# Patient Record
Sex: FEMALE | Race: WHITE
[De-identification: names, ages, dates, MRNs, and addresses within clinical notes are randomized per-mention and may not be internally consistent; named-entity substitution may affect disease eponyms.]

---

## 2020-06-04 ENCOUNTER — ERX REFILL RESPONSE (OUTPATIENT)
Age: 55
End: 2020-06-04

## 2020-06-04 RX ORDER — COLESEVELAM HYDROCHLORIDE 625 MG/1
TAKE ONE TABLET BY MOUTH TWICE A DAY TABLET, FILM COATED ORAL
Qty: 60 | Refills: 0

## 2020-06-25 ENCOUNTER — OFFICE VISIT (OUTPATIENT)
Dept: URBAN - METROPOLITAN AREA TELEHEALTH 2 | Facility: TELEHEALTH | Age: 55
End: 2020-06-25

## 2020-06-25 RX ORDER — METOCLOPRAMIDE HYDROCHLORIDE 10 MG/1
TAKE 1 TABLET BY ORAL ROUTE 3 TIMES A DAY FOR 15 DAYS TABLET ORAL
Qty: 45 | Refills: 0 | Status: ACTIVE | COMMUNITY
Start: 2018-01-04

## 2020-06-25 RX ORDER — ASCORBIC ACID 500 MG
TABLET ORAL
Qty: 0 | Refills: 0 | Status: ACTIVE | COMMUNITY
Start: 1900-01-01

## 2020-06-25 RX ORDER — COLESEVELAM HYDROCHLORIDE 625 MG/1
TAKE ONE TABLET BY MOUTH TWICE A DAY TABLET, FILM COATED ORAL
Qty: 60 | Refills: 0 | Status: ACTIVE | COMMUNITY

## 2020-06-25 RX ORDER — FUROSEMIDE 40 MG/1
TAKE 1 TABLET (40 MG) BY ORAL ROUTE ONCE DAILY TABLET ORAL 1
Qty: 0 | Refills: 0 | Status: ACTIVE | COMMUNITY
Start: 1900-01-01

## 2020-06-25 RX ORDER — CHROMIUM 200 MCG
TAKE 1 TABLET (0.8 MG) BY ORAL ROUTE ONCE DAILY TABLET ORAL 1
Qty: 0 | Refills: 0 | Status: ACTIVE | COMMUNITY
Start: 1900-01-01

## 2020-06-25 RX ORDER — POTASSIUM CHLORIDE 1.5 G/1
DISSOLVE AS DIRECTED 1 PACKET AND TAKE BY ORAL ROUTE DAILY POWDER, FOR SOLUTION ORAL 1
Qty: 0 | Refills: 0 | Status: ACTIVE | COMMUNITY
Start: 1900-01-01

## 2020-06-25 RX ORDER — MULTIVITAMIN WITH IRON
TAKE 1 TABLET BY ORAL ROUTE DAILY TABLET ORAL 1
Qty: 0 | Refills: 0 | Status: ACTIVE | COMMUNITY
Start: 1900-01-01

## 2020-06-25 RX ORDER — LEVOTHYROXINE SODIUM 125 UG/1
TAKE 1 TABLET (125 MCG) BY ORAL ROUTE ONCE DAILY TABLET ORAL 1
Qty: 0 | Refills: 0 | Status: ACTIVE | COMMUNITY
Start: 1900-01-01

## 2020-06-25 RX ORDER — SUCRALFATE 1 G/1
TAKE 1 TABLET BY ORAL ROUTE 3 TIMES A DAY TABLET ORAL
Qty: 0 | Refills: 0 | Status: ACTIVE | COMMUNITY
Start: 1900-01-01

## 2020-06-30 ENCOUNTER — TELEPHONE ENCOUNTER (OUTPATIENT)
Dept: URBAN - METROPOLITAN AREA CLINIC 115 | Facility: CLINIC | Age: 55
End: 2020-06-30

## 2020-08-06 ENCOUNTER — ERX REFILL RESPONSE (OUTPATIENT)
Age: 55
End: 2020-08-06

## 2020-08-06 RX ORDER — COLESEVELAM HYDROCHLORIDE 625 MG/1
TAKE ONE TABLET BY MOUTH TWICE A DAY TABLET, FILM COATED ORAL
Qty: 60 | Refills: 0

## 2020-08-28 ENCOUNTER — OFFICE VISIT (OUTPATIENT)
Dept: URBAN - METROPOLITAN AREA CLINIC 82 | Facility: CLINIC | Age: 55
End: 2020-08-28

## 2020-08-31 ENCOUNTER — TELEPHONE ENCOUNTER (OUTPATIENT)
Dept: URBAN - METROPOLITAN AREA CLINIC 92 | Facility: CLINIC | Age: 55
End: 2020-08-31

## 2020-08-31 RX ORDER — FAMOTIDINE 40 MG/1
1 TABLET AT BEDTIME TABLET, FILM COATED ORAL TWICE A DAY
Qty: 180 TABLET | Refills: 1 | OUTPATIENT
Start: 2020-08-31

## 2020-09-08 ENCOUNTER — ERX REFILL RESPONSE (OUTPATIENT)
Age: 55
End: 2020-09-08

## 2020-09-08 RX ORDER — COLESEVELAM HYDROCHLORIDE 625 MG/1
TAKE ONE TABLET BY MOUTH TWICE A DAY TABLET, FILM COATED ORAL
Qty: 60 | Refills: 0

## 2020-09-23 ENCOUNTER — OFFICE VISIT (OUTPATIENT)
Dept: URBAN - METROPOLITAN AREA TELEHEALTH 2 | Facility: TELEHEALTH | Age: 55
End: 2020-09-23
Payer: COMMERCIAL

## 2020-09-23 DIAGNOSIS — K21.9 GERD (GASTROESOPHAGEAL REFLUX DISEASE): ICD-10-CM

## 2020-09-23 DIAGNOSIS — K58.0 IRRITABLE BOWEL SYNDROME WITH DIARRHEA: ICD-10-CM

## 2020-09-23 PROCEDURE — G8427 DOCREV CUR MEDS BY ELIG CLIN: HCPCS | Performed by: INTERNAL MEDICINE

## 2020-09-23 PROCEDURE — G8417 CALC BMI ABV UP PARAM F/U: HCPCS | Performed by: INTERNAL MEDICINE

## 2020-09-23 PROCEDURE — G9903 PT SCRN TBCO ID AS NON USER: HCPCS | Performed by: INTERNAL MEDICINE

## 2020-09-23 PROCEDURE — 99214 OFFICE O/P EST MOD 30 MIN: CPT | Performed by: INTERNAL MEDICINE

## 2020-09-23 PROCEDURE — 3017F COLORECTAL CA SCREEN DOC REV: CPT | Performed by: INTERNAL MEDICINE

## 2020-09-23 PROCEDURE — 1036F TOBACCO NON-USER: CPT | Performed by: INTERNAL MEDICINE

## 2020-09-23 RX ORDER — LEVOTHYROXINE SODIUM 125 UG/1
TAKE 1 TABLET (125 MCG) BY ORAL ROUTE ONCE DAILY TABLET ORAL 1
Qty: 0 | Refills: 0 | Status: ACTIVE | COMMUNITY
Start: 1900-01-01

## 2020-09-23 RX ORDER — MULTIVITAMIN WITH IRON
TAKE 1 TABLET BY ORAL ROUTE DAILY TABLET ORAL 1
Qty: 0 | Refills: 0 | Status: ACTIVE | COMMUNITY
Start: 1900-01-01

## 2020-09-23 RX ORDER — POTASSIUM CHLORIDE 1.5 G/1
DISSOLVE AS DIRECTED 1 PACKET AND TAKE BY ORAL ROUTE DAILY POWDER, FOR SOLUTION ORAL 1
Qty: 0 | Refills: 0 | Status: ACTIVE | COMMUNITY
Start: 1900-01-01

## 2020-09-23 RX ORDER — FUROSEMIDE 40 MG/1
TAKE 1 TABLET (40 MG) BY ORAL ROUTE ONCE DAILY TABLET ORAL 1
Qty: 0 | Refills: 0 | Status: ACTIVE | COMMUNITY
Start: 1900-01-01

## 2020-09-23 RX ORDER — COLESEVELAM HYDROCHLORIDE 625 MG/1
TAKE ONE TABLET BY MOUTH TWICE A DAY TABLET, FILM COATED ORAL
Qty: 60 | Refills: 0 | Status: ACTIVE | COMMUNITY

## 2020-09-23 RX ORDER — ASCORBIC ACID 500 MG
TABLET ORAL
Qty: 0 | Refills: 0 | Status: ACTIVE | COMMUNITY
Start: 1900-01-01

## 2020-09-23 RX ORDER — CHROMIUM 200 MCG
TAKE 1 TABLET (0.8 MG) BY ORAL ROUTE ONCE DAILY TABLET ORAL 1
Qty: 0 | Refills: 0 | Status: ACTIVE | COMMUNITY
Start: 1900-01-01

## 2020-09-23 RX ORDER — SUCRALFATE 1 G/1
TAKE 1 TABLET BY ORAL ROUTE 3 TIMES A DAY TABLET ORAL
Qty: 0 | Refills: 0 | Status: ACTIVE | COMMUNITY
Start: 1900-01-01

## 2020-09-23 RX ORDER — FAMOTIDINE 40 MG/1
1 TABLET AT BEDTIME TABLET, FILM COATED ORAL TWICE A DAY
Qty: 180 TABLET | Refills: 1 | Status: ACTIVE | COMMUNITY
Start: 2020-08-31

## 2020-09-23 RX ORDER — METOCLOPRAMIDE HYDROCHLORIDE 10 MG/1
TAKE 1 TABLET BY ORAL ROUTE 3 TIMES A DAY FOR 15 DAYS TABLET ORAL
Qty: 45 | Refills: 0 | Status: ACTIVE | COMMUNITY
Start: 2018-01-04

## 2020-09-23 NOTE — HPI-TODAY'S VISIT:
09/23/2020 Patient was seen via Telehealth. She states she feels better. She was having acid reflux symptoms which improved with refill of Famotidine. She continues the Famotidine po bid. She has been using the Welchol once a day, because po bid causes her constipation. Overall, symptoms much improved.

## 2020-09-23 NOTE — HPI-OTHER HISTORIES
This is a scheduled follow-up appointment for this patient, a 54 year old /White female, after a previous visit approximately 3 months ago, for an evaluation for anastomotic ulcer after taking Proton Pump Inhibitors. The patient has been on Protonix for 6 months with worsening of symptoms. She states since her last visit, the WHAT TYPE OF SYMPTOMS HAVE OCCURRED is better. She recently underwent a EGD and ulcer healed. She still complains of diarrhea shall lose bowel movement is not the eats up to seven times a day. He denies of nausea, and previous abdominal pain resolved. still c/o occasional heartburn. c/o diarrhea adn unable to tolerate questran powder. diarrhea upto 8 times a day. no GI bleeding. she is on lotronex. still having diarrhea , symptoms better but not totally under controll.     1/04/2018 Patient had gastric ulcer which has improved and she also had gastric bypass.   Patient admits to intermittent nausea. Symptoms worsen after eating. She says that she had one episode of blood in vomit. She states shes has lost weight in the last few weeks. She states that she has been feeling fatigued. She states that she also has a kidney stone, and she states that she has back pain. She states that she takes Excedrin and, she occasionaly takes Xantac. She has not been takin omeprazole. She admits to some pain in her lower right quadrant. Patient has hasd an episode of melena and rectal bleeding.  03/21/2018 EGD on 1/11/18 showed gastritis and gastric bypass with a small pouch. Biopsy showed inflamamtion, no H pylori.   Patient is feeling better, symptoms improved. Denies abdominal pain, admits to some weight gain. Admits to loose bowels. Patient had a colonoscopy done in 2014. She was taking omeprazole. No abdominal pain.  07/12/2018 Patient denies bloating and abdominal pain. States she is still taking omeprazole and prilosec. Patient admits to occasional nausea. States she does not take any medication for it. Denies heartburn and reflux. Patient states last colonscopy was done in 2014, showed a polyp. Symptoms under control. No melena or hematochezia.  01/02/2020 Previous colonoscopy on 11/24/2014 showed one 6 mm polyp in sigmoid colon and Grade 2 internal hemorrhoids.  Patient states stomach is doing well. She has loose bowels due to gastric bypass surgery done 16 years ago, with cholecystectomy too. Denies any heartburn or acid reflux. Denies any melena or hematochezia. She reports she finally quit smoking since her last visit.  02/27/2020 Colonoscopy done on 01/27/2020 showed normal, hemorrhoids noted. Biopsies are normal, no colitis noted. Patient states loose bowels are about the same. She states she never received the prescriptions for Questran. Denies any abdominal pain, no acid reflux or heartburn. She states she stopped taking Protonix. No melena or hematochezia. She believes symptoms are related to gastric bypass surgery. She states she simply adjusted over the past 15 years.

## 2020-12-18 ENCOUNTER — TELEPHONE ENCOUNTER (OUTPATIENT)
Dept: URBAN - METROPOLITAN AREA CLINIC 100 | Facility: CLINIC | Age: 55
End: 2020-12-18

## 2020-12-18 RX ORDER — COLESEVELAM HYDROCHLORIDE 625 MG/1
1 TAB TABLET, FILM COATED ORAL TWICE A DAY
Qty: 60 | Refills: 1 | OUTPATIENT
Start: 2020-12-18

## 2020-12-18 RX ORDER — FAMOTIDINE 40 MG/1
1 TABLET AT BEDTIME TABLET, FILM COATED ORAL TWICE A DAY
Qty: 180 TABLET | Refills: 1 | OUTPATIENT
Start: 2020-12-18

## 2021-06-02 ENCOUNTER — ERX REFILL RESPONSE (OUTPATIENT)
Dept: URBAN - METROPOLITAN AREA CLINIC 82 | Facility: CLINIC | Age: 56
End: 2021-06-02

## 2021-06-02 RX ORDER — COLESEVELAM HYDROCHLORIDE 625 MG/1
TAKE ONE TABLET BY MOUTH TWICE A DAY TABLET, FILM COATED ORAL
Qty: 60 | Refills: 0

## 2021-07-12 ENCOUNTER — ERX REFILL RESPONSE (OUTPATIENT)
Dept: URBAN - METROPOLITAN AREA CLINIC 82 | Facility: CLINIC | Age: 56
End: 2021-07-12

## 2021-07-12 ENCOUNTER — TELEPHONE ENCOUNTER (OUTPATIENT)
Dept: URBAN - METROPOLITAN AREA CLINIC 115 | Facility: CLINIC | Age: 56
End: 2021-07-12

## 2021-07-12 RX ORDER — COLESEVELAM HYDROCHLORIDE 625 MG/1
TAKE ONE TABLET BY MOUTH TWICE A DAY TABLET, FILM COATED ORAL
Qty: 60 | Refills: 0 | OUTPATIENT

## 2021-07-12 RX ORDER — COLESEVELAM 625 MG/1
TAKE ONE TABLET BY MOUTH TWICE A DAY TABLET, FILM COATED ORAL
Qty: 60 TABLET | Refills: 1 | OUTPATIENT

## 2021-07-15 ENCOUNTER — DASHBOARD ENCOUNTERS (OUTPATIENT)
Age: 56
End: 2021-07-15

## 2021-07-15 ENCOUNTER — WEB ENCOUNTER (OUTPATIENT)
Dept: URBAN - METROPOLITAN AREA CLINIC 115 | Facility: CLINIC | Age: 56
End: 2021-07-15

## 2021-07-15 ENCOUNTER — LAB OUTSIDE AN ENCOUNTER (OUTPATIENT)
Dept: URBAN - METROPOLITAN AREA CLINIC 115 | Facility: CLINIC | Age: 56
End: 2021-07-15

## 2021-07-15 ENCOUNTER — OFFICE VISIT (OUTPATIENT)
Dept: URBAN - METROPOLITAN AREA CLINIC 115 | Facility: CLINIC | Age: 56
End: 2021-07-15
Payer: COMMERCIAL

## 2021-07-15 DIAGNOSIS — Z98.84 STATUS POST GASTRIC BYPASS FOR OBESITY: ICD-10-CM

## 2021-07-15 DIAGNOSIS — K21.9 GERD (GASTROESOPHAGEAL REFLUX DISEASE): ICD-10-CM

## 2021-07-15 DIAGNOSIS — R19.7 DIARRHEA: ICD-10-CM

## 2021-07-15 DIAGNOSIS — K58.9 IRRITABLE BOWEL SYNDROME (IBS): ICD-10-CM

## 2021-07-15 PROCEDURE — G8427 DOCREV CUR MEDS BY ELIG CLIN: HCPCS | Performed by: INTERNAL MEDICINE

## 2021-07-15 PROCEDURE — G9903 PT SCRN TBCO ID AS NON USER: HCPCS | Performed by: INTERNAL MEDICINE

## 2021-07-15 PROCEDURE — G8417 CALC BMI ABV UP PARAM F/U: HCPCS | Performed by: INTERNAL MEDICINE

## 2021-07-15 PROCEDURE — 99214 OFFICE O/P EST MOD 30 MIN: CPT | Performed by: INTERNAL MEDICINE

## 2021-07-15 RX ORDER — FAMOTIDINE 40 MG/1
1 TABLET AT BEDTIME TABLET, FILM COATED ORAL TWICE A DAY
Qty: 180 TABLET | Refills: 1 | Status: ACTIVE | COMMUNITY
Start: 2020-12-18

## 2021-07-15 RX ORDER — COLESEVELAM HYDROCHLORIDE 625 MG/1
TAKE ONE TABLET BY MOUTH TWICE A DAY TABLET, FILM COATED ORAL
Qty: 60 | Refills: 0 | Status: ACTIVE | COMMUNITY

## 2021-07-15 RX ORDER — MULTIVITAMIN WITH IRON
TAKE 1 TABLET BY ORAL ROUTE DAILY TABLET ORAL 1
Qty: 0 | Refills: 0 | Status: ACTIVE | COMMUNITY
Start: 1900-01-01

## 2021-07-15 RX ORDER — FAMOTIDINE 40 MG/1
1 TABLET TABLET, FILM COATED ORAL TWICE A DAY
Qty: 180 TABLET | Refills: 1 | OUTPATIENT
Start: 2021-07-15

## 2021-07-15 RX ORDER — LEVOTHYROXINE SODIUM 125 UG/1
TAKE 1 TABLET (125 MCG) BY ORAL ROUTE ONCE DAILY TABLET ORAL 1
Qty: 0 | Refills: 0 | Status: ACTIVE | COMMUNITY
Start: 1900-01-01

## 2021-07-15 RX ORDER — PANTOPRAZOLE SODIUM 40 MG/1
1 TABLET TABLET, DELAYED RELEASE ORAL ONCE A DAY
Qty: 90 | Refills: 1 | OUTPATIENT
Start: 2021-07-15

## 2021-07-15 RX ORDER — SUCRALFATE 1 G/1
TAKE 1 TABLET BY ORAL ROUTE 3 TIMES A DAY TABLET ORAL
Qty: 0 | Refills: 0 | Status: ACTIVE | COMMUNITY
Start: 1900-01-01

## 2021-07-15 RX ORDER — ASCORBIC ACID 500 MG
TABLET ORAL
Qty: 0 | Refills: 0 | Status: ACTIVE | COMMUNITY
Start: 1900-01-01

## 2021-07-15 RX ORDER — POTASSIUM CHLORIDE 1.5 G/1
DISSOLVE AS DIRECTED 1 PACKET AND TAKE BY ORAL ROUTE DAILY POWDER, FOR SOLUTION ORAL 1
Qty: 0 | Refills: 0 | Status: ACTIVE | COMMUNITY
Start: 1900-01-01

## 2021-07-15 RX ORDER — COLESEVELAM HYDROCHLORIDE 625 MG/1
1 TAB TABLET, FILM COATED ORAL TWICE A DAY
Qty: 60 | Refills: 1 | OUTPATIENT
Start: 2021-07-15

## 2021-07-15 RX ORDER — METOCLOPRAMIDE HYDROCHLORIDE 10 MG/1
TAKE 1 TABLET BY ORAL ROUTE 3 TIMES A DAY FOR 15 DAYS TABLET ORAL
Qty: 45 | Refills: 0 | Status: ACTIVE | COMMUNITY
Start: 2018-01-04

## 2021-07-15 RX ORDER — FAMOTIDINE 40 MG/1
1 TABLET AT BEDTIME TABLET, FILM COATED ORAL TWICE A DAY
Qty: 180 TABLET | Refills: 1 | Status: ACTIVE | COMMUNITY
Start: 2020-08-31

## 2021-07-15 RX ORDER — FUROSEMIDE 40 MG/1
TAKE 1 TABLET (40 MG) BY ORAL ROUTE ONCE DAILY TABLET ORAL 1
Qty: 0 | Refills: 0 | Status: ACTIVE | COMMUNITY
Start: 1900-01-01

## 2021-07-15 RX ORDER — CHROMIUM 200 MCG
TAKE 1 TABLET (0.8 MG) BY ORAL ROUTE ONCE DAILY TABLET ORAL 1
Qty: 0 | Refills: 0 | Status: ACTIVE | COMMUNITY
Start: 1900-01-01

## 2021-07-15 NOTE — HPI-TODAY'S VISIT:
09/23/2020 Patient was seen via Telehealth. She states she feels better. She was having acid reflux symptoms which improved with refill of Famotidine. She continues the Famotidine po bid. She has been using the Welchol once a day, because po bid causes her constipation. Overall, symptoms much improved.   07/15/2021 Patient presents for an office visit. Colonoscopy on 01/27/2020 was normal. EGD on 01/11/2018 showed gastric ulcer with anastomosis. Patient c/o nocturnal symptoms of heartburn. She was taking famotidine in the mornings. She denies any abdominal pain aside from the burning feeling. She does not take NSAIDs. Her diarrhea has improved in the beginning of the day on Welchol every morning, but symptoms return by the end of the day. She had kidney stones removed in February but a recent US (on Monday) showed a new 9mm kidney stone. Her urologist Dr. Martinez started her on potassium citrate 6 times a day, but she says the pills are passing directly through her system without absorption. She was taken off calcium. Patient also reports occasional slurred speech and extensive fatigue.

## 2021-07-22 ENCOUNTER — TELEPHONE ENCOUNTER (OUTPATIENT)
Dept: URBAN - METROPOLITAN AREA CLINIC 92 | Facility: CLINIC | Age: 56
End: 2021-07-22

## 2021-07-22 RX ORDER — PANTOPRAZOLE SODIUM 40 MG/1
1 TABLET TABLET, DELAYED RELEASE ORAL ONCE A DAY
Qty: 90 | Refills: 1
Start: 2021-07-15

## 2021-07-22 RX ORDER — FAMOTIDINE 40 MG/1
1 TABLET TABLET, FILM COATED ORAL TWICE A DAY
Qty: 180 TABLET | Refills: 1
Start: 2021-07-15

## 2021-08-06 PROBLEM — 235595009 GASTROESOPHAGEAL REFLUX DISEASE: Status: ACTIVE | Noted: 2021-07-15

## 2021-08-16 ENCOUNTER — OFFICE VISIT (OUTPATIENT)
Dept: URBAN - METROPOLITAN AREA SURGERY CENTER 13 | Facility: SURGERY CENTER | Age: 56
End: 2021-08-16
Payer: COMMERCIAL

## 2021-08-16 DIAGNOSIS — Z98.84 BARIATRIC SURG STAT-UNSP: ICD-10-CM

## 2021-08-16 DIAGNOSIS — R10.13 ABDOMINAL DISCOMFORT, EPIGASTRIC: ICD-10-CM

## 2021-08-16 DIAGNOSIS — K22.8 COLUMNAR-LINED ESOPHAGUS: ICD-10-CM

## 2021-08-16 DIAGNOSIS — K31.89 ACQUIRED DEFORMITY OF DUODENUM: ICD-10-CM

## 2021-08-16 PROCEDURE — 43239 EGD BIOPSY SINGLE/MULTIPLE: CPT | Performed by: INTERNAL MEDICINE

## 2021-08-16 PROCEDURE — G8907 PT DOC NO EVENTS ON DISCHARG: HCPCS | Performed by: INTERNAL MEDICINE

## 2021-08-27 ENCOUNTER — TELEPHONE ENCOUNTER (OUTPATIENT)
Dept: URBAN - METROPOLITAN AREA CLINIC 115 | Facility: CLINIC | Age: 56
End: 2021-08-27

## 2021-08-27 RX ORDER — CHOLESTYRAMINE 4 G/9G
1 PACKET MIXED WITH WATER OR NON-CARBONATED DRINK POWDER, FOR SUSPENSION ORAL TWICE A DAY
Qty: 60 | Refills: 1 | OUTPATIENT
Start: 2021-09-03

## 2022-03-28 ENCOUNTER — TELEPHONE ENCOUNTER (OUTPATIENT)
Dept: URBAN - METROPOLITAN AREA CLINIC 115 | Facility: CLINIC | Age: 57
End: 2022-03-28

## 2022-03-28 RX ORDER — FAMOTIDINE 40 MG/1
1 TABLET TABLET, FILM COATED ORAL TWICE A DAY
Qty: 180 | Refills: 0
Start: 2021-07-15